# Patient Record
Sex: MALE | Race: WHITE
[De-identification: names, ages, dates, MRNs, and addresses within clinical notes are randomized per-mention and may not be internally consistent; named-entity substitution may affect disease eponyms.]

---

## 2021-01-10 ENCOUNTER — TRANSCRIPTION ENCOUNTER (OUTPATIENT)
Age: 66
End: 2021-01-10

## 2021-01-10 PROBLEM — Z00.00 ENCOUNTER FOR PREVENTIVE HEALTH EXAMINATION: Status: ACTIVE | Noted: 2021-01-10

## 2021-08-05 ENCOUNTER — APPOINTMENT (OUTPATIENT)
Dept: OPHTHALMOLOGY | Facility: CLINIC | Age: 66
End: 2021-08-05

## 2021-10-07 ENCOUNTER — APPOINTMENT (OUTPATIENT)
Dept: OPHTHALMOLOGY | Facility: CLINIC | Age: 66
End: 2021-10-07
Payer: MEDICARE

## 2021-10-07 ENCOUNTER — NON-APPOINTMENT (OUTPATIENT)
Age: 66
End: 2021-10-07

## 2021-10-07 PROCEDURE — 92083 EXTENDED VISUAL FIELD XM: CPT

## 2021-10-07 PROCEDURE — 92133 CPTRZD OPH DX IMG PST SGM ON: CPT

## 2021-10-27 ENCOUNTER — NON-APPOINTMENT (OUTPATIENT)
Age: 66
End: 2021-10-27

## 2021-10-27 ENCOUNTER — APPOINTMENT (OUTPATIENT)
Dept: OPHTHALMOLOGY | Facility: CLINIC | Age: 66
End: 2021-10-27
Payer: MEDICARE

## 2021-10-27 PROCEDURE — 92134 CPTRZ OPH DX IMG PST SGM RTA: CPT

## 2021-10-27 PROCEDURE — 92014 COMPRE OPH EXAM EST PT 1/>: CPT

## 2021-11-04 ENCOUNTER — APPOINTMENT (OUTPATIENT)
Dept: NEUROLOGY | Facility: CLINIC | Age: 66
End: 2021-11-04
Payer: MEDICARE

## 2021-11-04 VITALS — HEART RATE: 59 BPM | SYSTOLIC BLOOD PRESSURE: 146 MMHG | HEIGHT: 71 IN | DIASTOLIC BLOOD PRESSURE: 89 MMHG

## 2021-11-04 DIAGNOSIS — E78.00 PURE HYPERCHOLESTEROLEMIA, UNSPECIFIED: ICD-10-CM

## 2021-11-04 DIAGNOSIS — M19.90 UNSPECIFIED OSTEOARTHRITIS, UNSPECIFIED SITE: ICD-10-CM

## 2021-11-04 PROCEDURE — 99205 OFFICE O/P NEW HI 60 MIN: CPT | Mod: 25

## 2021-11-04 PROCEDURE — G2212 PROLONG OUTPT/OFFICE VIS: CPT

## 2021-11-04 RX ORDER — SIMVASTATIN 5 MG/1
5 TABLET, FILM COATED ORAL
Qty: 30 | Refills: 0 | Status: ACTIVE | COMMUNITY
Start: 2021-11-04

## 2021-11-04 RX ORDER — MULTIVIT-MIN/FOLIC/VIT K/LYCOP 400-300MCG
1000 TABLET ORAL DAILY
Qty: 30 | Refills: 0 | Status: ACTIVE | COMMUNITY
Start: 2021-11-04

## 2021-11-04 RX ORDER — MULTIVIT-MIN/FOLIC/VIT K/LYCOP 400-300MCG
25 MCG TABLET ORAL DAILY
Qty: 30 | Refills: 0 | Status: ACTIVE | COMMUNITY
Start: 2021-11-04

## 2021-11-05 RX ORDER — ALPRAZOLAM 0.5 MG/1
0.5 TABLET ORAL
Qty: 20 | Refills: 0 | Status: ACTIVE | COMMUNITY
Start: 2021-10-08

## 2021-11-05 RX ORDER — IBUPROFEN 800 MG/1
800 TABLET, FILM COATED ORAL
Qty: 30 | Refills: 0 | Status: ACTIVE | COMMUNITY
Start: 2021-10-18

## 2021-11-05 RX ORDER — VALACYCLOVIR 1 G/1
1 TABLET, FILM COATED ORAL
Qty: 4 | Refills: 0 | Status: ACTIVE | COMMUNITY
Start: 2021-10-08

## 2021-11-05 NOTE — PROCEDURE
[FreeTextEntry1] : EXTENDED NEUROBEHAVIORAL STATUS TESTING\par \par [This is a separate procedure note for the Neurobehavioral Status Examination performed during this encounter.]\par \par Mr. PEREYRA was awake and alert, well groomed, and in no acute distress. He had fluent speech and made no paraphasic errors. There was no anomia in conversation. Comprehension was intact. He was engaged in the discussion. He recounted his history well. He did not appear anxious or depressed.\par \par Recent memory: Mr. PEREYRA can name the president and can say what he did yesterday (accompanied his wife to a Pain Management appointment).\par \par MoCA (Version 7.1) score out of 30: 22\par \par Memory Index Score (MIS) out of 15: 8\par \par Visuospatial/Executive\par           Trails: (-1) Does not draw lines past D\par           Cube: Intact\par           Clock: Intact\par \par Naming: Intact x 3\par \par Memory (Registration): 4/5 on both trials (Missing word, "Maria Alejandra," repeated at end)\par \par Attention:\par           Digit span 5 Forward: Intact\par           Digit span 3 Reverse: Intact\par           Letter A test: Intact\par           Serial 7 subtraction: (-1)  93 --> 86 --> 69 --> 62 --> 57\par \par Language:\par           Phrase repetition: (-1) Says "while" instead of "when in the second sentence\par           Word fluency (F): 19 words\par \par Abstraction:\par           Train/Bicycle: "Transportation"\par           Watch/Ruler: "Measurement devices"\par \par Delayed recall score out of 5: (-3) 2 words\par           Additional words with category cue: 0 words (incorrectly recalls 2 words with category cues)\par           Additional words with multiple choice cue: 2 words (incorrectly recalls 1 word with MCQ cues)\par \par Orientation: (-1) Says "October" instead of "November"\par \par \par Additional Neurobehavioral status tests:\par \par Animal naming fluency: 21 words\par \par Praxis:\par \par Ideomotor limb\par Transitive:\par           Brush teeth: Intact b/l\par           Comb hair: Intact b/l\par Intransitive:\par           Wave goodbye: Intact b/l\par           Motion "come here": Intact b/l\par Meaningless gestures: Intact to 4/4\par \par Right/Left Orientation:\par           "Show me your right hand": Correct\par           "Show me your left hand": Correct\par           "With your left hand, point to your left ear": Correct\par           "With your right hand, point to your left shoulder": Correct\par           "With your left hand, point to my left ear": Incorrect (points to my right ear)\par           "With your right hand, point to my left shoulder": Correct\par \par INTERPRETATION:\par \par I have carefully reviewed the above neurobehavioral status testing results. The cognitive domains are listed below, as well as my interpretation of whether or not there is an impairment or if performance was within normal limits. 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

## 2021-11-05 NOTE — ASSESSMENT
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

## 2021-11-05 NOTE — DATA REVIEWED
[de-identified] : Brian Hung M.D. 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 ([place], [place]) (5/28/03 & 6/11/03):\par - Deficits in phonetic analysis, working memory, and speed of processing\par - Meets criteria for dyslexia, spelling disability, dyscalculia, and dysgraphia

## 2021-11-05 NOTE — HISTORY OF PRESENT ILLNESS
[FreeTextEntry1] : This is a 66-year-old left-handed man (writes and eats with left hand, swings a bat with the right hand) who has been noticing over the past 3 years that he has been having more frequent episodes misplacing items, forgetting tasks, and missing turns while driving. He has difficulty remembering information within a conversation within the same day, as well as with memorizing information, such as telephone numbers or the names of neighbors. He has been told he has poor working memory. He can feel nervous navigating in new or busy places, such as airports. He recounts that he was diagnosed with dyslexia as a child in the 1960s. He did not achieve full literacy until his 40s after working with an adult  for 5 years. He had COVID-19 infection in January 2021, characterized by extreme fatigue, loss of appetite, abnormal taste, and low oxygen saturation down into the 80s %; during this time, he lost 13 pounds.

## 2021-11-05 NOTE — PHYSICAL EXAM
[General Appearance - Alert] : alert [General Appearance - In No Acute Distress] : in no acute distress [Oriented To Time, Place, And Person] : oriented to person, place, and time [Impaired Insight] : insight and judgment were intact [Affect] : the affect was normal [Person] : oriented to person [Place] : oriented to place [Time] : oriented to time [Concentration Intact] : normal concentrating ability [Visual Intact] : visual attention was ~T not ~L decreased [Naming Objects] : no difficulty naming common objects [Fluency] : fluency intact [Comprehension] : comprehension intact [___ / 5] : Visuospatial / Executive: [unfilled] / 5 [0 / 0] : Memory: 0 / 0 [___ / 3] : Attention (Serial 7 subtraction): [unfilled] / 3 [___ / 1] : Fluency: [unfilled] / 1 [___ / 2] : Abstraction: [unfilled] / 2 [___ / 5] : Delayed Recall: [unfilled] / 5 [___ / 6] : Orientation: [unfilled] / 6 [Cranial Nerves Oculomotor (III)] : extraocular motion intact [Cranial Nerves Optic (II)] : visual acuity intact bilaterally,  visual fields full to confrontation, pupils equal round and reactive to light [Cranial Nerves Trigeminal (V)] : facial sensation intact symmetrically [Cranial Nerves Facial (VII)] : face symmetrical [Cranial Nerves Vestibulocochlear (VIII)] : hearing was intact bilaterally [Cranial Nerves Glossopharyngeal (IX)] : tongue and palate midline [Cranial Nerves Accessory (XI - Cranial And Spinal)] : head turning and shoulder shrug symmetric [Cranial Nerves Hypoglossal (XII)] : there was no tongue deviation with protrusion [Motor Strength] : muscle strength was normal in all four extremities [No Muscle Atrophy] : normal bulk in all four extremities [Motor Handedness Right-Handed] : the patient is right hand dominant [Sensation Tactile Decrease] : light touch was intact [Sensation Pain / Temperature Decrease] : pain and temperature was intact [Balance] : balance was intact [2+] : Ankle jerk left 2+ [Sclera] : the sclera and conjunctiva were normal [PERRL With Normal Accommodation] : pupils were equal in size, round, reactive to light, with normal accommodation [Outer Ear] : the ears and nose were normal in appearance [Extraocular Movements] : extraocular movements were intact [Oropharynx] : the oropharynx was normal [Neck Appearance] : the appearance of the neck was normal [Auscultation Breath Sounds / Voice Sounds] : lungs were clear to auscultation bilaterally [Heart Sounds] : normal S1 and S2 [Heart Rate And Rhythm] : heart rate was normal and rhythm regular [Arterial Pulses Carotid] : carotid pulses were normal with no bruits [Full Pulse] : the pedal pulses are present [Abdomen Soft] : soft [Abdomen Tenderness] : non-tender [No CVA Tenderness] : no ~M costovertebral angle tenderness [No Spinal Tenderness] : no spinal tenderness [Abnormal Walk] : normal gait [Nail Clubbing] : no clubbing  or cyanosis of the fingernails [Musculoskeletal - Swelling] : no joint swelling seen [Motor Tone] : muscle strength and tone were normal [Skin Turgor] : normal skin turgor [Skin Color & Pigmentation] : normal skin color and pigmentation [] : no rash [FreeTextEntry1] : Neurocognitive Examination Functionality Questionnaire\par \par Relationship: Self\par Frequency of interactions in the past month: Daily / Lives with\par \par Grewal Index of [place] in Activities of Daily Living:\par 1. Bathing/Showerin\par 2. Dressin\par 3. Toiletin\par 4. Transferrin\par 5. Continence: 1\par 6: Feedin\par \par TOTAL: 6\par \par \par [place]inna-Sacha Instrumental Activities of Daily Living:\par A. Ability to Use Telephone: 1\par B. Shoppin (usually accompanied)\par C. Food Preparation: 1\par D. Housekeepin\par E. Laundry: 1\par F. Transportation: 0 (stopped driving 2 years ago because of eyesight problems with Stargadt disease affecting vision)\par G. Responsibility for Own Medications: 1\par H: Ability to Handle Finances: 1 (assisted by daughter)\par \par TOTAL: 6\par \par Extended Neurobehavioral Status Testing and interpretation are outlined in the Procedure section.\par  [Repeating Phrases] : difficulty repeating a phrase [Paresis Pronator Drift Right-Sided] : no pronator drift on the right [Paresis Pronator Drift Left-Sided] : no pronator drift on the left [Motor Strength Upper Extremities Bilaterally] : strength was normal in both upper extremities [Motor Strength Lower Extremities Bilaterally] : strength was normal in both lower extremities [Romberg's Sign] : Romberg's sign was negtive [Allodynia] : no ~T allodynia present [Past-pointing] : there was no past-pointing [Tremor] : no tremor present [Dysdiadochokinesia Bilaterally] : not present [Coordination - Dysmetria Impaired Finger-to-Nose Bilateral] : not present [Coordination - Dysmetria Impaired Heel-to-Shin Bilateral] : not present [Plantar Reflex Right Only] : normal on the right [Plantar Reflex Left Only] : normal on the left [___] : absent on the right [___] : absent on the left [MocaTotal] : 22 [FreeTextEntry8] : Normal, narrow-based gait. No difficulty with tiptoe, heel, and tandem gaits.

## 2022-05-04 ENCOUNTER — APPOINTMENT (OUTPATIENT)
Dept: OPHTHALMOLOGY | Facility: CLINIC | Age: 67
End: 2022-05-04
Payer: MEDICARE

## 2022-05-04 ENCOUNTER — NON-APPOINTMENT (OUTPATIENT)
Age: 67
End: 2022-05-04

## 2022-05-04 PROCEDURE — 92134 CPTRZ OPH DX IMG PST SGM RTA: CPT

## 2022-05-04 PROCEDURE — 92014 COMPRE OPH EXAM EST PT 1/>: CPT

## 2022-09-13 ENCOUNTER — NON-APPOINTMENT (OUTPATIENT)
Age: 67
End: 2022-09-13

## 2022-11-02 ENCOUNTER — NON-APPOINTMENT (OUTPATIENT)
Age: 67
End: 2022-11-02

## 2022-11-02 ENCOUNTER — APPOINTMENT (OUTPATIENT)
Dept: OPHTHALMOLOGY | Facility: CLINIC | Age: 67
End: 2022-11-02

## 2022-11-02 PROCEDURE — 92133 CPTRZD OPH DX IMG PST SGM ON: CPT

## 2022-11-02 PROCEDURE — 92083 EXTENDED VISUAL FIELD XM: CPT

## 2022-11-16 ENCOUNTER — APPOINTMENT (OUTPATIENT)
Dept: OPHTHALMOLOGY | Facility: CLINIC | Age: 67
End: 2022-11-16

## 2022-11-16 ENCOUNTER — NON-APPOINTMENT (OUTPATIENT)
Age: 67
End: 2022-11-16

## 2022-11-16 PROCEDURE — 99214 OFFICE O/P EST MOD 30 MIN: CPT

## 2023-01-19 ENCOUNTER — APPOINTMENT (OUTPATIENT)
Dept: NEUROLOGY | Facility: CLINIC | Age: 68
End: 2023-01-19
Payer: MEDICARE

## 2023-01-19 VITALS
WEIGHT: 222 LBS | HEART RATE: 63 BPM | DIASTOLIC BLOOD PRESSURE: 77 MMHG | BODY MASS INDEX: 31.08 KG/M2 | SYSTOLIC BLOOD PRESSURE: 138 MMHG | HEIGHT: 71 IN

## 2023-01-19 PROCEDURE — 96116 NUBHVL XM PHYS/QHP 1ST HR: CPT | Mod: 59

## 2023-01-19 PROCEDURE — G2212 PROLONG OUTPT/OFFICE VIS: CPT

## 2023-01-19 PROCEDURE — 99215 OFFICE O/P EST HI 40 MIN: CPT | Mod: 25

## 2023-01-19 RX ORDER — TURMERIC 400 MG
400 CAPSULE ORAL
Qty: 30 | Refills: 0 | Status: DISCONTINUED | COMMUNITY
Start: 2021-11-04 | End: 2023-01-19

## 2023-01-19 RX ORDER — AMOXICILLIN 875 MG/1
875 TABLET, FILM COATED ORAL
Qty: 14 | Refills: 0 | Status: DISCONTINUED | COMMUNITY
Start: 2021-10-18 | End: 2023-01-19

## 2023-02-15 ENCOUNTER — NON-APPOINTMENT (OUTPATIENT)
Age: 68
End: 2023-02-15

## 2023-02-15 ENCOUNTER — APPOINTMENT (OUTPATIENT)
Dept: OPHTHALMOLOGY | Facility: CLINIC | Age: 68
End: 2023-02-15
Payer: MEDICARE

## 2023-02-15 PROCEDURE — 99213 OFFICE O/P EST LOW 20 MIN: CPT

## 2023-02-15 PROCEDURE — 92133 CPTRZD OPH DX IMG PST SGM ON: CPT | Mod: RT

## 2023-03-02 ENCOUNTER — APPOINTMENT (OUTPATIENT)
Dept: NUCLEAR MEDICINE | Facility: CLINIC | Age: 68
End: 2023-03-02
Payer: MEDICARE

## 2023-03-02 ENCOUNTER — RESULT REVIEW (OUTPATIENT)
Age: 68
End: 2023-03-02

## 2023-03-02 PROCEDURE — 78608 BRAIN IMAGING (PET): CPT

## 2023-03-02 PROCEDURE — 78999 UNLISTED MISC PX DX NUC MED: CPT
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
## 2023-03-12 NOTE — PHYSICAL EXAM
[General Appearance - Alert] : alert [General Appearance - In No Acute Distress] : in no acute distress [Oriented To Time, Place, And Person] : oriented to person, place, and time [Impaired Insight] : insight and judgment were intact [Affect] : the affect was normal [Person] : oriented to person [Place] : oriented to place [Time] : oriented to time [Concentration Intact] : normal concentrating ability [Visual Intact] : visual attention was ~T not ~L decreased [Naming Objects] : no difficulty naming common objects [Fluency] : fluency intact [Comprehension] : comprehension intact [0 / 0] : Memory: 0 / 0 [___ / 3] : Attention (Serial 7 subtraction): [unfilled] / 3 [___ / 1] : Fluency: [unfilled] / 1 [Cranial Nerves Optic (II)] : visual acuity intact bilaterally,  visual fields full to confrontation, pupils equal round and reactive to light [Cranial Nerves Oculomotor (III)] : extraocular motion intact [Cranial Nerves Trigeminal (V)] : facial sensation intact symmetrically [Cranial Nerves Facial (VII)] : face symmetrical [Cranial Nerves Vestibulocochlear (VIII)] : hearing was intact bilaterally [Cranial Nerves Glossopharyngeal (IX)] : tongue and palate midline [Cranial Nerves Accessory (XI - Cranial And Spinal)] : head turning and shoulder shrug symmetric [Cranial Nerves Hypoglossal (XII)] : there was no tongue deviation with protrusion [Motor Strength] : muscle strength was normal in all four extremities [No Muscle Atrophy] : normal bulk in all four extremities [Motor Handedness Right-Handed] : the patient is right hand dominant [Sensation Tactile Decrease] : light touch was intact [Sensation Pain / Temperature Decrease] : pain and temperature was intact [Balance] : balance was intact [2+] : Ankle jerk left 2+ [Sclera] : the sclera and conjunctiva were normal [PERRL With Normal Accommodation] : pupils were equal in size, round, reactive to light, with normal accommodation [Extraocular Movements] : extraocular movements were intact [Outer Ear] : the ears and nose were normal in appearance [Oropharynx] : the oropharynx was normal [Neck Appearance] : the appearance of the neck was normal [Auscultation Breath Sounds / Voice Sounds] : lungs were clear to auscultation bilaterally [Heart Rate And Rhythm] : heart rate was normal and rhythm regular [Heart Sounds] : normal S1 and S2 [Arterial Pulses Carotid] : carotid pulses were normal with no bruits [Full Pulse] : the pedal pulses are present [Abdomen Soft] : soft [Abdomen Tenderness] : non-tender [No CVA Tenderness] : no ~M costovertebral angle tenderness [No Spinal Tenderness] : no spinal tenderness [Abnormal Walk] : normal gait [Nail Clubbing] : no clubbing  or cyanosis of the fingernails [Musculoskeletal - Swelling] : no joint swelling seen [Motor Tone] : muscle strength and tone were normal [Skin Color & Pigmentation] : normal skin color and pigmentation [Skin Turgor] : normal skin turgor [] : no rash [Repeating Phrases] : no difficulty repeating a phrase [___ / 5] : Visuospatial / Executive: [unfilled] / 5 [___ / 2] : Repeat: [unfilled] / 2 [___ / 5] : Delayed Recall: [unfilled] / 5 [___ / 6] : Orientation: [unfilled] / 6 [FreeTextEntry1] : Neurocognitive Examination Functionality Questionnaire\par \par Relationship: Self\par Frequency of interactions in the past month: Daily / Lives with\par \par Grewal Index of [place] in Activities of Daily Living:\par 1. Bathing/Showerin\par 2. Dressin\par 3. Toiletin\par 4. Transferrin\par 5. Continence: 1\par 6: Feedin\par \par TOTAL: 6\par \par \par Corin[place]-Sacha Instrumental Activities of Daily Living:\par A. Ability to Use Telephone: 1\par B. Shoppin (usually accompanied)\par C. Food Preparation: 1\par D. Housekeepin \par E. Laundry: 1\par F. 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 Responsibility for Own Medications: 1\par H: Ability to Handle Finances: 1 (assisted by daughter)\par \par TOTAL: 6\par \par \par Extended Neurobehavioral Status Testing and interpretation are outlined in the Procedure section.\par  [Paresis Pronator Drift Right-Sided] : no pronator drift on the right [Paresis Pronator Drift Left-Sided] : no pronator drift on the left [Motor Strength Upper Extremities Bilaterally] : strength was normal in both upper extremities [Motor Strength Lower Extremities Bilaterally] : strength was normal in both lower extremities [Romberg's Sign] : Romberg's sign was negtive [Allodynia] : no ~T allodynia present [Past-pointing] : there was no past-pointing [Tremor] : no tremor present [Dysdiadochokinesia Bilaterally] : not present [Coordination - Dysmetria Impaired Finger-to-Nose Bilateral] : not present [Coordination - Dysmetria Impaired Heel-to-Shin Bilateral] : not present [Plantar Reflex Right Only] : normal on the right [Plantar Reflex Left Only] : normal on the left [___] : absent on the right [___] : absent on the left [MocaTotal] : 25 [FreeTextEntry8] : Normal, narrow-based gait. No difficulty with tiptoe, heel, and tandem gaits.

## 2023-03-12 NOTE — ASSESSMENT
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

## 2023-03-12 NOTE — DATA REVIEWED
[de-identified] : MRI Brain (9/6/22): Per report,\par - No acute intracranial abnormality [de-identified] : Brian Hung M.D. 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 ([place], [place]) (5/28/03 & 6/11/03):\par - Deficits in phonetic analysis, working memory, and speed of processing\par - Meets criteria for dyslexia, spelling disability, dyscalculia, and dysgraphia

## 2023-03-29 ENCOUNTER — APPOINTMENT (OUTPATIENT)
Dept: OPHTHALMOLOGY | Facility: CLINIC | Age: 68
End: 2023-03-29
Payer: MEDICARE

## 2023-03-29 ENCOUNTER — NON-APPOINTMENT (OUTPATIENT)
Age: 68
End: 2023-03-29

## 2023-03-29 PROCEDURE — 99213 OFFICE O/P EST LOW 20 MIN: CPT

## 2023-04-18 ENCOUNTER — APPOINTMENT (OUTPATIENT)
Dept: NEUROLOGY | Facility: CLINIC | Age: 68
End: 2023-04-18
Payer: MEDICARE

## 2023-04-18 VITALS
HEART RATE: 61 BPM | WEIGHT: 224 LBS | SYSTOLIC BLOOD PRESSURE: 150 MMHG | HEIGHT: 71 IN | DIASTOLIC BLOOD PRESSURE: 69 MMHG | BODY MASS INDEX: 31.36 KG/M2

## 2023-04-18 DIAGNOSIS — G31.84 MILD COGNITIVE IMPAIRMENT, SO STATED: ICD-10-CM

## 2023-04-18 DIAGNOSIS — Z86.16 PERSONAL HISTORY OF COVID-19: ICD-10-CM

## 2023-04-18 DIAGNOSIS — R48.0 DYSLEXIA AND ALEXIA: ICD-10-CM

## 2023-04-18 DIAGNOSIS — H40.9 UNSPECIFIED GLAUCOMA: ICD-10-CM

## 2023-04-18 PROCEDURE — 99215 OFFICE O/P EST HI 40 MIN: CPT | Mod: 25

## 2023-04-18 PROCEDURE — G2212 PROLONG OUTPT/OFFICE VIS: CPT

## 2023-04-18 PROCEDURE — 96116 NUBHVL XM PHYS/QHP 1ST HR: CPT | Mod: 59

## 2023-05-09 PROBLEM — Z86.16 HISTORY OF COVID-19: Status: RESOLVED | Noted: 2021-11-04 | Resolved: 2023-05-09

## 2023-05-09 PROBLEM — G31.84 MILD COGNITIVE IMPAIRMENT WITH MEMORY LOSS: Status: ACTIVE | Noted: 2021-11-04

## 2023-05-09 PROBLEM — R48.0 DYSLEXIA: Status: RESOLVED | Noted: 2021-11-05 | Resolved: 2023-05-09
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
## 2023-05-09 NOTE — DATA REVIEWED
[de-identified] : MRI Brain (9/6/22): Per report,\par - No acute intracranial abnormality [de-identified] : PET-FDG MR Brain (3/23/23):\par - Hypometabolism pattern consistent with Lewy body dementia [de-identified] : Brian Hung M.D. 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 ([place], [place]) (5/28/03 & 6/11/03):\par - Deficits in phonetic analysis, working memory, and speed of processing\par - Meets criteria for dyslexia, spelling disability, dyscalculia, and dysgraphia

## 2023-05-09 NOTE — ASSESSMENT
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

## 2023-05-09 NOTE — PROCEDURE
[FreeTextEntry1] : EXTENDED NEUROBEHAVIORAL STATUS TESTING\par \par [This is a separate procedure note for the Neurobehavioral Status Examination performed during this encounter.]\par \par Mr. PEREYRA was awake and alert, well groomed, and in no acute distress. He had fluent speech and made no paraphasic errors. There was no anomia in conversation. Comprehension was intact. He was engaged in the discussion. He recounted his history well. He did not appear anxious or depressed.\par \par Recent memory: Mr. PEREYRA can name the president and can say what he did yesterday (accompanied his wife and son-in-law to his wife's Pain Management appointment in [place] at 11:00 am).\par \par MMSE Score out of 30: 28\par \par Orientation:\par      Time: \par      Place: \par \par Registration: 3/3\par \par Attention and Calculation: \par \par Recall: 3/3\par \par Language:\par      Namin/2\par      Repeat sentence: \par      Three-stage command: 3/3\par      Read & obey command: \par      Write sentence: 0 (incomplete sentence)\par \par Copyin/1\par \par \par Additional Neurobehavioral status tests:\par \par Fruit naming fluency: 17 words\par \par Praxis:\par \par Ideomotor limb\par Transitive:\par           Pick teeth: Intact b/l\par           Write with pen: Intact b/l\par Intransitive:\par           Wave goodbye: Intact b/l\par           Motion "come here": Intact b/l\par Meaningless gestures: Intact to \par \par Right/Left Orientation:\par           "Show me your right hand": Correct\par           "Show me your left hand": Correct\par           "With your left hand, point to your left ear": Correct\par           "With your right hand, point to your left shoulder": Correct\par           "With your left hand, point to my left ear": Correct\par           "With your right hand, point to my left shoulder": Incorrect (points to my right shoulder)\par \par INTERPRETATION:\par \par I have carefully reviewed the above neurobehavioral status testing results. The cognitive domains are listed below, as well as my interpretation of whether or not there is an impairment or if performance was within normal limits. 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

## 2023-05-09 NOTE — PHYSICAL EXAM
[General Appearance - Alert] : alert [General Appearance - In No Acute Distress] : in no acute distress [Oriented To Time, Place, And Person] : oriented to person, place, and time [Impaired Insight] : insight and judgment were intact [Affect] : the affect was normal [Person] : oriented to person [Place] : oriented to place [Time] : oriented to time [Concentration Intact] : normal concentrating ability [Visual Intact] : visual attention was ~T not ~L decreased [Naming Objects] : no difficulty naming common objects [Repeating Phrases] : no difficulty repeating a phrase [Fluency] : fluency intact [Comprehension] : comprehension intact [___ / 5] : Visuospatial / Executive: [unfilled] / 5 [0 / 0] : Memory: 0 / 0 [___ / 3] : Attention (Serial 7 subtraction): [unfilled] / 3 [___ / 1] : Fluency: [unfilled] / 1 [___ / 2] : Abstraction: [unfilled] / 2 [___ / 5] : Delayed Recall: [unfilled] / 5 [___ / 6] : Orientation: [unfilled] / 6 [Cranial Nerves Oculomotor (III)] : extraocular motion intact [Cranial Nerves Trigeminal (V)] : facial sensation intact symmetrically [Cranial Nerves Facial (VII)] : face symmetrical [Cranial Nerves Vestibulocochlear (VIII)] : hearing was intact bilaterally [Cranial Nerves Glossopharyngeal (IX)] : tongue and palate midline [Cranial Nerves Accessory (XI - Cranial And Spinal)] : head turning and shoulder shrug symmetric [Cranial Nerves Hypoglossal (XII)] : there was no tongue deviation with protrusion [Motor Strength] : muscle strength was normal in all four extremities [No Muscle Atrophy] : normal bulk in all four extremities [Motor Handedness Right-Handed] : the patient is right hand dominant [Sensation Tactile Decrease] : light touch was intact [Sensation Pain / Temperature Decrease] : pain and temperature was intact [Balance] : balance was intact [2+] : Ankle jerk left 2+ [Sclera] : the sclera and conjunctiva were normal [PERRL With Normal Accommodation] : pupils were equal in size, round, reactive to light, with normal accommodation [Extraocular Movements] : extraocular movements were intact [Outer Ear] : the ears and nose were normal in appearance [Oropharynx] : the oropharynx was normal [Neck Appearance] : the appearance of the neck was normal [Auscultation Breath Sounds / Voice Sounds] : lungs were clear to auscultation bilaterally [Heart Rate And Rhythm] : heart rate was normal and rhythm regular [Heart Sounds] : normal S1 and S2 [Arterial Pulses Carotid] : carotid pulses were normal with no bruits [Full Pulse] : the pedal pulses are present [Abdomen Soft] : soft [Abdomen Tenderness] : non-tender [No CVA Tenderness] : no ~M costovertebral angle tenderness [No Spinal Tenderness] : no spinal tenderness [Abnormal Walk] : normal gait [Musculoskeletal - Swelling] : no joint swelling seen [Nail Clubbing] : no clubbing  or cyanosis of the fingernails [Motor Tone] : muscle strength and tone were normal [Skin Color & Pigmentation] : normal skin color and pigmentation [Skin Turgor] : normal skin turgor [] : no rash [Peripheral Vision Was Full To Confrontation Bilaterally] : peripheral vision was full to confrontation bilaterally [Central Scotomata Both Eyes] : 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 Bathing/Showerin\par 2. Dressin\par 3. Toiletin\par 4. Transferrin\par 5. Continence: 1\par 6: Feedin\par \par TOTAL: 6\par \par \par [place]in[place]-Sacha Instrumental Activities of Daily Living:\par A. Ability to Use Telephone: 1\par B. Shoppin (usually accompanied)\par C. Food Preparation: 1\par D. Housekeepin \par E. Laundry: 1\par F. 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 Responsibility for Own Medications: 1\par H: Ability to Handle Finances: 1 (assisted by daughter)\par \par TOTAL: 6\par \par \par Extended Neurobehavioral Status Testing and interpretation are outlined in the Procedure section.\par  [Total Score ___ / 30] : the patient achieved a score of [unfilled] /30 [Date / Time ___ / 5] : date / time [unfilled] / 5 [Place ___ / 5] : place [unfilled] / 5 [Registration ___ / 3] : registration [unfilled] / 3 [Serial Sevens ___/5] : serial sevens [unfilled] / 5 [Naming 2 Objects ___ / 2] : naming two objects [unfilled] / 2 [Repeating a Sentence ___ / 1] : repeating a sentence [unfilled] / 1 [Writing a Sentence ___ / 1] : write sentence [unfilled] / 1 [3-stage Verbal Command ___ / 3] : three-stage verbal command [unfilled] / 3 [Written Command ___ / 1] : written command [unfilled] / 1 [Copy a Design ___ / 1] : copy a design [unfilled] / 1 [Recall ___ / 3] : recall [unfilled] / 3 [Paresis Pronator Drift Right-Sided] : no pronator drift on the right [Paresis Pronator Drift Left-Sided] : no pronator drift on the left [Motor Strength Upper Extremities Bilaterally] : strength was normal in both upper extremities [Motor Strength Lower Extremities Bilaterally] : strength was normal in both lower extremities [Romberg's Sign] : Romberg's sign was negtive [Allodynia] : no ~T allodynia present [Past-pointing] : there was no past-pointing [Tremor] : no tremor present [Dysdiadochokinesia Bilaterally] : not present [Coordination - Dysmetria Impaired Finger-to-Nose Bilateral] : not present [Coordination - Dysmetria Impaired Heel-to-Shin Bilateral] : not present [Plantar Reflex Right Only] : normal on the right [Plantar Reflex Left Only] : normal on the left [___] : absent on the right [___] : absent on the left [FreeTextEntry8] : Normal, narrow-based gait. No difficulty with tiptoe, heel, and tandem gaits.

## 2023-06-14 ENCOUNTER — APPOINTMENT (OUTPATIENT)
Dept: OPHTHALMOLOGY | Facility: CLINIC | Age: 68
End: 2023-06-14
Payer: MEDICARE

## 2023-06-14 ENCOUNTER — NON-APPOINTMENT (OUTPATIENT)
Age: 68
End: 2023-06-14

## 2023-06-14 PROCEDURE — 92133 CPTRZD OPH DX IMG PST SGM ON: CPT

## 2023-06-14 PROCEDURE — 92014 COMPRE OPH EXAM EST PT 1/>: CPT

## 2023-12-27 ENCOUNTER — NON-APPOINTMENT (OUTPATIENT)
Age: 68
End: 2023-12-27

## 2023-12-27 ENCOUNTER — APPOINTMENT (OUTPATIENT)
Dept: OPHTHALMOLOGY | Facility: CLINIC | Age: 68
End: 2023-12-27
Payer: MEDICARE

## 2023-12-27 PROCEDURE — 92133 CPTRZD OPH DX IMG PST SGM ON: CPT

## 2023-12-27 PROCEDURE — 92083 EXTENDED VISUAL FIELD XM: CPT

## 2024-01-25 ENCOUNTER — APPOINTMENT (OUTPATIENT)
Dept: OPHTHALMOLOGY | Facility: CLINIC | Age: 69
End: 2024-01-25
Payer: MEDICARE

## 2024-01-25 ENCOUNTER — NON-APPOINTMENT (OUTPATIENT)
Age: 69
End: 2024-01-25

## 2024-01-25 PROCEDURE — 92134 CPTRZ OPH DX IMG PST SGM RTA: CPT

## 2024-01-25 PROCEDURE — 92014 COMPRE OPH EXAM EST PT 1/>: CPT

## 2024-08-15 ENCOUNTER — APPOINTMENT (OUTPATIENT)
Dept: OPHTHALMOLOGY | Facility: CLINIC | Age: 69
End: 2024-08-15
Payer: MEDICARE

## 2024-08-15 ENCOUNTER — NON-APPOINTMENT (OUTPATIENT)
Age: 69
End: 2024-08-15

## 2024-08-15 PROCEDURE — 92133 CPTRZD OPH DX IMG PST SGM ON: CPT

## 2024-08-15 PROCEDURE — 92014 COMPRE OPH EXAM EST PT 1/>: CPT

## 2025-03-28 ENCOUNTER — APPOINTMENT (OUTPATIENT)
Dept: OPHTHALMOLOGY | Facility: CLINIC | Age: 70
End: 2025-03-28
Payer: MEDICARE

## 2025-03-28 PROCEDURE — 92133 CPTRZD OPH DX IMG PST SGM ON: CPT

## 2025-03-28 PROCEDURE — 92083 EXTENDED VISUAL FIELD XM: CPT

## 2025-05-19 ENCOUNTER — APPOINTMENT (OUTPATIENT)
Dept: OPHTHALMOLOGY | Facility: CLINIC | Age: 70
End: 2025-05-19
Payer: MEDICARE

## 2025-05-19 ENCOUNTER — NON-APPOINTMENT (OUTPATIENT)
Age: 70
End: 2025-05-19

## 2025-05-19 PROCEDURE — 92014 COMPRE OPH EXAM EST PT 1/>: CPT

## 2025-05-19 PROCEDURE — 92134 CPTRZ OPH DX IMG PST SGM RTA: CPT
